# Patient Record
Sex: MALE | Race: WHITE | Employment: OTHER | URBAN - METROPOLITAN AREA
[De-identification: names, ages, dates, MRNs, and addresses within clinical notes are randomized per-mention and may not be internally consistent; named-entity substitution may affect disease eponyms.]

---

## 2017-03-01 ENCOUNTER — DOCUMENTATION ONLY (OUTPATIENT)
Dept: FAMILY MEDICINE CLINIC | Age: 77
End: 2017-03-01

## 2017-03-02 RX ORDER — FUROSEMIDE 40 MG/1
TABLET ORAL
Qty: 30 TAB | Refills: 0 | Status: SHIPPED | OUTPATIENT
Start: 2017-03-02 | End: 2017-04-15 | Stop reason: SDUPTHER

## 2017-03-07 ENCOUNTER — DOCUMENTATION ONLY (OUTPATIENT)
Dept: FAMILY MEDICINE CLINIC | Age: 77
End: 2017-03-07

## 2017-03-10 ENCOUNTER — OP HISTORICAL/CONVERTED ENCOUNTER (OUTPATIENT)
Dept: OTHER | Age: 77
End: 2017-03-10

## 2017-04-17 RX ORDER — FUROSEMIDE 40 MG/1
TABLET ORAL
Qty: 30 TAB | Refills: 0 | Status: SHIPPED | OUTPATIENT
Start: 2017-04-17

## 2017-06-09 ENCOUNTER — ED HISTORICAL/CONVERTED ENCOUNTER (OUTPATIENT)
Dept: OTHER | Age: 77
End: 2017-06-09

## 2017-06-21 ENCOUNTER — PATIENT OUTREACH (OUTPATIENT)
Dept: FAMILY MEDICINE CLINIC | Age: 77
End: 2017-06-21

## 2017-06-21 NOTE — PROGRESS NOTES
6/21/17, Patient listed on Daily Census/MSSP Report with alert of HCA/Hospitalization. This writer/NN able to confirm patient has required inpatient admission, 6/20/17 at [facility], related to Bilateral LE swelling and Shortness of Breath. Also, recent [facility] hospitalization, 5/25/17 -5/31/17 at [facility] related to COPD Exacerbation. Printed H&P, ED Provider Note and recent Discharge Summary for Dr Frank Shoemaker to review, however, patient last seen at [facility]/Dr Husain, 8/16/2016. Patient remains FULL CODE at this time per [facility] documentation. This writer/NN will continue to monitor for discharge and attempt to engage patient with NN/CCM. See Previous NN Documentation:  9/14/16, This writer/NN received message, PASSPORT REPORT, with alert of HCA/ED visit. This writer/NN and HCA access, able to confirm recent ED visit, patient seen 9/13/16 at [facility]/ED,[facility] related to shortness of breath/COPD with acute bronchitis. Printed ED Provider note for Dr Frank Shoemaker to review, as patient seen by Dr Frank Shoemaker, 8/16/16. Per ED provider note, patient seen at [facility], possible Bronchitis and suspicious mass on CXR. Patient offered admission, refused, agrees to F/U with PULM/Dr Reji Rai. Past Medical History Includes: COPD, CAD, Chronic Renal Failure, HTN, Hyperlipidemia, BPH and PVD. 9/14/16, This writer/NN attempted to contact patient at listed phone number, however, reached wife, [facility], listed on HIPAA verified with 2 identifiers, states patient is resting at this time. [facility] allowed this writer/NN to explain purpose of call, recent ED visit, importance of understanding discharge instructions, medications and F/U appts. Terrence Murray understanding and beginning expressing concerns of recent illness and healthcare.  This writer/NN allowed extra time for [facility] to voice frustrations, [facility] states patient stays in close contact with PULM/Dr Reji Rai, was given Z-pack about 2 weeks ago when all began, lots of weakness and weight loss, PULM recommended ED, however, tried [facility] on 9/12/16, not a positive experience, patient very weak, still not a lot of po intake, went home, finally went to sleep, then when awoke to go to bathroom, increased shortness of breath and at that point called 911/taken to [facility], and states still not a lot of care, still no one has addressed the weakness. Instruction provided on home health. Deyanira Quispe understanding, states patient ate a piece of toast, remains weak, has a call placed to [facility] and has scheduled appt with [facility], 9/23/16, agrees to continue to monitor through the day and return call if patient would like to schedule F/U appt or discuss HH options.  Sigifredo Davis agrees to allow this writer/NN to continue to follow, denies further questions at this time

## 2017-06-30 ENCOUNTER — PATIENT OUTREACH (OUTPATIENT)
Dept: FAMILY MEDICINE CLINIC | Age: 77
End: 2017-06-30

## 2017-06-30 NOTE — PROGRESS NOTES
6/30/17, Patient again listed on Daily Census/MSSP Report with alert of [facility] Hospitalization. This writer/NN and HCA access, able to confirm, patient admitted to [facility], 6/29/17, related to worsening shortness of breath. Printed H&P and Consult Notes for Dr Leah Cha to review, as this is 3rd recent hospitalization and patient last seen at [facility]/Dr Husain, 8/16/2016. Patient S/P  6/20/17 - 6/23/17 [facility], related to Bilateral LE swelling/Cellulitis and Shortness of Breath. Patient S/P  5/25/17 - 5/31/17 [facility] related to COPD Exacerbation. Patient remains FULL CODE at this time per [facility] documentation. 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 PLAN: This writer/NN will continue to monitor for discharge, assist with Transitions of Care, and attempt to engage patient with NN/CCM. See Previous NN/Patient Outreach Documentation:  9/14/16, This writer/NN received message, PASSPORT REPORT, with alert of HCA/ED visit. This writer/NN and HCA access, able to confirm recent ED visit, patient seen 9/13/16 at [facility]/ED,[facility] related to shortness of breath/COPD with acute bronchitis. Printed ED Provider note for Dr Leah Cha to review, as patient seen by Dr Leah Cha, 8/16/16. Per ED provider note, patient seen at [facility], possible Bronchitis and suspicious mass on CXR. Patient offered admission, refused, agrees to F/U with PULM/Dr Guerline Blum. Past Medical History Includes: COPD, CAD, Chronic Renal Failure, HTN, Hyperlipidemia, BPH and PVD. 9/14/16, This writer/NN attempted to contact patient at listed phone number, however, reached wife, Katalina Christianson, listed on HIPAA verified with 2 identifiers, states patient is resting at this time.  Katalina Christianson allowed this writer/NN to explain purpose of call, recent ED visit, importance of understanding discharge instructions, medications and F/U appts. Christian Smith understanding and beginning expressing concerns of recent illness and healthcare. This writer/NN allowed extra time for Damon Salguero to voice frustrations, Damon Salguero states patient stays in close contact with PULM/Dr Brannon Willingham, was given Z-pack about 2 weeks ago when all began, lots of weakness and weight loss, PULM recommended ED, however, tried [facility] on 9/12/16, not a positive experience, patient very weak, still not a lot of po intake, went home, finally went to sleep, then when awoke to go to bathroom, increased shortness of breath and at that point called 911/taken to [facility], and states still not a lot of care, still no one has addressed the weakness. Instruction provided on home health. Christian Smith understanding, states patient ate a piece of toast, remains weak, has a call placed to [facility] and has scheduled appt with [facility], 9/23/16, agrees to continue to monitor through the day and return call if patient would like to schedule F/U appt or discuss HH options.  Damon Salguero agrees to allow this writer/NN to continue to follow, denies further questions at this time

## 2017-07-03 ENCOUNTER — PATIENT OUTREACH (OUTPATIENT)
Dept: FAMILY MEDICINE CLINIC | Age: 77
End: 2017-07-03

## 2017-07-03 NOTE — PROGRESS NOTES
7/3/17, This writer/NN and HCA access, able to confirm, patient remains inpatient at this time, per documentation, may need further REHAB/[facility]. PLAN: This writer/NN will continue to monitor for discharge, assist with Transitions of Care, and attempt to engage patient with NN/CCM. See Previous NN/Patient Outreach Documentation:  6/30/17, Patient again listed on Daily Census/MSSP Report with alert of [facility] Hospitalization. This writer/NN and HCA access, able to confirm, patient admitted to [facility], 6/29/17, related to worsening shortness of breath. Printed H&P and Consult Notes for Dr Gregoria Cloud to review, as this is 3rd recent hospitalization and patient last seen at [facility]/Dr Husain, 8/16/2016. Patient S/P  6/20/17 - 6/23/17 [facility], related to Bilateral LE swelling/Cellulitis and Shortness of Breath. Patient S/P  5/25/17 - 5/31/17 [facility] related to COPD Exacerbation. Patient remains FULL CODE at this time per [facility] documentation. 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 See Previous NN/Patient Outreach Documentation:  9/14/16, This writer/NN received message, PASSPORT REPORT, with alert of H[facility]/ED visit. This writer/NN and HCA access, able to confirm recent ED visit, patient seen 9/13/16 at [facility]/ED,[facility] related to shortness of breath/COPD with acute bronchitis. Printed ED Provider note for Dr Gregoria Cloud to review, as patient seen by Dr Gregoria Cloud, 8/16/16. Per ED provider note, patient seen at [facility], possible Bronchitis and suspicious mass on CXR. Patient offered admission, refused, agrees to F/U with PULM/Dr Daksha Ghosh. Past Medical History Includes: COPD, CAD, Chronic Renal Failure, HTN, Hyperlipidemia, BPH and PVD.   9/14/16, This writer/NN attempted to contact patient at listed phone number, however, reached wife, Diane Scott, listed on HIPAA verified with 2 identifiers, states patient is resting at this time. Diane Scott allowed this writer/NN to explain purpose of call, recent ED visit, importance of understanding discharge instructions, medications and F/U appts. Tonya Gamboa understanding and beginning expressing concerns of recent illness and healthcare. This writer/NN allowed extra time for Diane Scott to voice frustrations, Diane Scott states patient stays in close contact with PULM/Dr Vivienne Slater, was given Z-pack about 2 weeks ago when all began, lots of weakness and weight loss, PULM recommended ED, however, tried [facility] on 9/12/16, not a positive experience, patient very weak, still not a lot of po intake, went home, finally went to sleep, then when awoke to go to bathroom, increased shortness of breath and at that point called 911/taken to [facility], and states still not a lot of care, still no one has addressed the weakness. Instruction provided on home health. Tonya Gamboa understanding, states patient ate a piece of toast, remains weak, has a call placed to [facility] and has scheduled appt with [facility], 9/23/16, agrees to continue to monitor through the day and return call if patient would like to schedule F/U appt or discuss HH options.  Diane Scott agrees to allow this writer/NN to continue to follow, denies further questions at this time

## 2017-07-05 ENCOUNTER — PATIENT OUTREACH (OUTPATIENT)
Dept: FAMILY MEDICINE CLINIC | Age: 77
End: 2017-07-05

## 2017-07-05 NOTE — PROGRESS NOTES
7/5/17, This writer/NN and HCA access, able to confirm, patient hospitalized at [facility], 6/29/17 - 7/3/17 related to COPD Exacerbation, discharged to [facility] for further [address] Per Discharge Summary:  Will need to monitor CBC/BMP(monitor Creatinine and Potassium, on lasix). Complete Prednisone Taper and then, back to 5mg daily. Close PULM and Cardiology F/U. Discuss Coreg with Cardiology/Dr Urbina, log BP and daily weights, call for 3-5 lb weight gain. Lasix to be given on Mondays and Thursdays, monitor lower extremity swelling. Per PULM-complete full course of Doxycycline, then start Azithromycin three times a week for COPD Prophylaxis against infections, PULM to determine duration needed. 7/5/17, This writer/NN contacted [facility], [phone], spoke to [place], [facility] patient remains at facility at this time, Salty Zepeda will be , left voicemail message with [facility] contact information, encourage PCP f/u, as patient last seen at [facility]/Dr Rhona Carbajal, 8/16/16,  and request for return call. PLAN: This writer/NN will continue to monitor for discharge, assist with Transitions of Care, and attempt to engage patient with NN/CCM. See Previous NN/Patient Outreach Documentation:  6/30/17, Patient again listed on Daily Census/MSSP Report with alert of [facility] Hospitalization. This writer/NN and HCA access, able to confirm, patient admitted to [facility], 6/29/17, related to worsening shortness of breath. Printed H&P and Consult Notes for Dr Rhona Carbajal to review, as this is 3rd recent hospitalization and patient last seen at [facility]/Dr Husain, 8/16/2016. Patient S/P  6/20/17 - 6/23/17 [facility], related to Bilateral LE swelling/Cellulitis and Shortness of Breath. Patient S/P  5/25/17 - 5/31/17 [facility] related to COPD Exacerbation. Patient remains FULL CODE at this time per [facility] documentation.   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 See Previous NN/Patient Outreach Documentation:  9/14/16, This writer/NN received message, PASSPORT REPORT, with alert of H[facility]/ED visit. This writer/NN and HCA access, able to confirm recent ED visit, patient seen 9/13/16 at [facility] related to shortness of breath/COPD with acute bronchitis. Printed ED Provider note for Dr Jacob Hinson to review, as patient seen by Dr Jacob Hinson, 8/16/16. Per ED provider note, patient seen at [facility], possible Bronchitis and suspicious mass on CXR. Patient offered admission, refused, agrees to F/U with PULM/Dr Jose Villarreal. Past Medical History Includes: COPD, CAD, Chronic Renal Failure, HTN, Hyperlipidemia, BPH and PVD. 9/14/16, This writer/NN attempted to contact patient at listed phone number, however, reached wife, Sammi Velasquez, listed on HIPAA verified with 2 identifiers, states patient is resting at this time. Sammi Velasquez allowed this writer/NN to explain purpose of call, recent ED visit, importance of understanding discharge instructions, medications and F/U appts. Pham Ritter understanding and beginning expressing concerns of recent illness and healthcare. This writer/NN allowed extra time for Sammi Velasquez to voice frustrations, Sammi Velasquez states patient stays in close contact with PULM/Dr Jose Villarreal, was given Z-pack about 2 weeks ago when all began, lots of weakness and weight loss, PULM recommended ED, however, tried [facility] on 9/12/16, not a positive experience, patient very weak, still not a lot of po intake, went home, finally went to sleep, then when awoke to go to bathroom, increased shortness of breath and at that point called 911/taken to [facility], and states still not a lot of care, still no one has addressed the weakness. Instruction provided on home health.  Pham Ritter understanding, states patient ate a piece of toast, remains weak, has a call placed to [facility] and has scheduled appt with [facility], 9/23/16, agrees to continue to monitor through the day and return call if patient would like to schedule F/U appt or discuss HH options.  Gamaliel Orantes agrees to allow this writer/NN to continue to follow, denies further questions at this time

## 2017-07-11 ENCOUNTER — PATIENT OUTREACH (OUTPATIENT)
Dept: FAMILY MEDICINE CLINIC | Age: 77
End: 2017-07-11

## 2017-07-11 NOTE — PROGRESS NOTES
[facility] - 7/3/17 related to COPD Exacerbation, discharged to [facility] for further [address] Per Discharge Summary:  Will need to monitor CBC/BMP(monitor Creatinine and Potassium, on lasix). Complete Prednisone Taper and then, back to 5mg daily. Close PULM and Cardiology F/U. Discuss Coreg with Cardiology/Dr Urbina, log BP and daily weights, call for 3-5 lb weight gain. Lasix to be given on Mondays and Thursdays, monitor lower extremity swelling. Per PULM-complete full course of Doxycycline, then start Azithromycin three times a week for COPD Prophylaxis against infections, PULM to determine duration needed. 7/11/17, This writer/NN contacted [facility], [phone], spoke to Domo Watkins, , states patient progressing towards discharge this week, will confirm PCP, currently lists [facility], agrees to discuss with patient and if [facility]/Dr Ashley Stringer remains PCP, agrees to fax discharge summary/medication reconciliation to [facility], [phone]. Patient will need continued HH/[facility] has been selected. PLAN: This writer/NN will continue to monitor for discharge, assist with Transitions of Care, and attempt to engage patient with NN/CCM. See Previous NN/Patient Outreach Documentation:  6/30/17, Patient again listed on Daily Census/MSSP Report with alert of [facility] Hospitalization. This writer/NN and HCA access, able to confirm, patient admitted to [facility], 6/29/17, related to worsening shortness of breath. Printed H&P and Consult Notes for Dr Ashley Stringer to review, as this is 3rd recent hospitalization and patient last seen at [facility]/Dr Husain, 8/16/2016. Patient S/P  6/20/17 - 6/23/17 [facility], related to Bilateral LE swelling/Cellulitis and Shortness of Breath. Patient S/P  5/25/17 - 5/31/17 [facility] related to COPD Exacerbation. Patient remains FULL CODE at this time per [facility] documentation.   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 See Previous NN/Patient Outreach Documentation:  9/14/16, This writer/NN received message, PASSPORT REPORT, with alert of H[facility]/ED visit. This writer/NN and H[facility] access, able to confirm recent ED visit, patient seen 9/13/16 at [facility]/ED,[facility] related to shortness of breath/COPD with acute bronchitis. Printed ED Provider note for Dr Priscilla Turner to review, as patient seen by Dr Priscilla Turner, 8/16/16. Per ED provider note, patient seen at [facility], possible Bronchitis and suspicious mass on CXR. Patient offered admission, refused, agrees to F/U with PULM/Dr Tariq Ramesh. Past Medical History Includes: COPD, CAD, Chronic Renal Failure, HTN, Hyperlipidemia, BPH and PVD. 9/14/16, This writer/NN attempted to contact patient at listed phone number, however, reached wife, Suly Vasquez, listed on HIPAA verified with 2 identifiers, states patient is resting at this time. Suly Vasquez allowed this writer/NN to explain purpose of call, recent ED visit, importance of understanding discharge instructions, medications and F/U appts. Ja Session understanding and beginning expressing concerns of recent illness and healthcare. This writer/NN allowed extra time for Suly Vasquez to voice frustrations, Suly Vasquez states patient stays in close contact with PULM/Dr Tariq Ramesh, was given Z-pack about 2 weeks ago when all began, lots of weakness and weight loss, PULM recommended ED, however, tried [facility] on 9/12/16, not a positive experience, patient very weak, still not a lot of po intake, went home, finally went to sleep, then when awoke to go to bathroom, increased shortness of breath and at that point called 911/taken to [facility], and states still not a lot of care, still no one has addressed the weakness.  Instruction provided on home health. Jonathan Corona understanding, states patient ate a piece of toast, remains weak, has a call placed to [facility] and has scheduled appt with [facility], 9/23/16, agrees to continue to monitor through the day and return call if patient would like to schedule F/U appt or discuss HH options.  Donnell Abebe agrees to allow this writer/NN to continue to follow, denies further questions at this time

## 2017-07-13 ENCOUNTER — PATIENT OUTREACH (OUTPATIENT)
Dept: FAMILY MEDICINE CLINIC | Age: 77
End: 2017-07-13

## 2017-07-13 NOTE — Clinical Note
Dr Tere Aponte, I changed PCP in [place] [id number] for further Inpatient PULMONARY REHAB, 7/3/17 - 7/12/17 [facility], S/P 6/29/17 - 7/3/17 related to COPD Exacerbation, Jose  to assist. 7/13/17, This writer/NN contacted [facility], [phone], spoke to Linda Sharma to discuss above hospitalization/REHAB and possible [facility], and need to confirm PCP, as patient last seen 8/2016. Josef verbalizes understanding, [facility] scheduled to start care today, agrees to send message to team to discuss PCP. Linda Sharma agrees to keep [facility] contact information, fax medication reconciliation and call with further updates, questions or concerns. 7/13/17, This writer/NN returned call to Jose, spoke to CINTHYA [facility] nurse has been to the home and confirmed, patient to return to [facility] for PCP needs.  Thanks, Chao Ruiz Initial /73 (BP Location: Left arm, Cuff Size: Adult Regular)  Pulse 76  SpO2 100% There is no height or weight on file to calculate BMI. .

## 2017-07-13 NOTE — PROGRESS NOTES
Patient recently at  [facility] for further Inpatient PULMONARY REHAB, 7/3/17 - 7/12/17 [facility], S/P 6/29/17 - 7/3/17 related to COPD Exacerbation[facility].  7/13/17, This writer/NN contacted [facility] [facility], [phone], spoke to Lai Epperson to discuss above hospitalization/REHAB and possible need for further [facility], and need to confirm PCP, as patient last seen 8/2016. Josef verbalizes understanding, [facility] scheduled to start care today, agrees to send message to team to discuss PCP. Lai Epperson agrees to keep IFP/NN contact information, fax medication reconciliation and call with further updates, questions or concerns. 7/13/17, This writer/NN returned call to [facility], spoke to CINTHYA, [facility] nurse has been to the home and confirmed, patient to return to [facility] for PCP needs. CINTHYA agrees to keep IFP/NN contact information to call with further questions or concerns. This writer/NN agrees to notify Dr Yessi Beck. PLAN: Continue to assist with Transitions of Care, verify PCP and attempt to engage patient with NN/CCM. Discuss discharge instructions, medications(compliance and reconciliation), further symptoms or concerns, daily weights, HH progress and F/U appts. Provide instruction, goal work and resource connection as indicated. See Previous NN/Patient Outreach Documentation:  Patient S/P [facility], 6/29/17 - 7/3/17 related to COPD Exacerbation, discharged to [facility] for further Inpatient PULMONARY REHAB.   Per Discharge Summary:  Will need to monitor CBC/BMP(monitor Creatinine and Potassium, on lasix). Complete Prednisone Taper and then, back to 5mg daily. Close PULM and Cardiology F/U. Discuss Coreg with Cardiology/Dr Urbina, log BP and daily weights, call for 3-5 lb weight gain. Lasix to be given on Mondays and Thursdays, monitor lower extremity swelling.   Per PULM-complete full course of Doxycycline, then start Azithromycin three times a week for COPD Prophylaxis against infections, PULM to determine duration needed. 7/11/17, This writer/NN contacted [facility], [phone], spoke to Tomy Schmidt, , states patient progressing towards discharge this week, will confirm PCP, currently lists [facility], agrees to discuss with patient and if [facility]/Dr Mindy Silverio remains PCP, agrees to fax discharge summary/medication reconciliation to IFP/NN, [phone]. Patient will need continued HH/[facility] has been selected. See Previous NN/Patient Outreach Documentation:  6/30/17, Patient again listed on Daily Census/MSSP Report with alert of [facility] Hospitalization. This writer/NN and [facility] access, able to confirm, patient admitted to [facility], 6/29/17, related to worsening shortness of breath. Printed H&P and Consult Notes for Dr Mindy Silverio to review, as this is 3rd recent hospitalization and patient last seen at [facility]/Dr Husain, 8/16/2016. Patient S/P  6/20/17 - 6/23/17 [facility], related to Bilateral LE swelling/Cellulitis and Shortness of Breath. Patient S/P  5/25/17 - 5/31/17 [facility] related to COPD Exacerbation. Patient remains FULL CODE at this time per [facility] documentation. 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 See Previous NN/Patient Outreach Documentation:  9/14/16, This writer/NN received message, PASSPORT REPORT, with alert of [facility]/ED visit. This writer/NN and [facility] access, able to confirm recent ED visit, patient seen 9/13/16 at [facility]/ED,[facility] related to shortness of breath/COPD with acute bronchitis. Printed ED Provider note for Dr Mindy Silverio to review, as patient seen by Dr Mindy Silverio, 8/16/16. Per ED provider note, patient seen at [facility], possible Bronchitis and suspicious mass on CXR.  Patient offered admission, refused, agrees to F/U with PULM/Dr [place]. Past Medical History Includes: COPD, CAD, Chronic Renal Failure, HTN, Hyperlipidemia, BPH and PVD. 9/14/16, This writer/NN attempted to contact patient at listed phone number, however, reached wife, Consuelo Badillo, listed on HIPAA verified with 2 identifiers, states patient is resting at this time. Consuelo Ridiam allowed this writer/NN to explain purpose of call, recent ED visit, importance of understanding discharge instructions, medications and F/U appts. Farzana Rincon understanding and beginning expressing concerns of recent illness and healthcare. This writer/NN allowed extra time for Consuelo Ridiam to voice frustrations, Consuelo Badillo states patient stays in close contact with PULM/Dr aFraz Araujo, was given Z-pack about 2 weeks ago when all began, lots of weakness and weight loss, PULM recommended ED, however, tried [facility] on 9/12/16, not a positive experience, patient very weak, still not a lot of po intake, went home, finally went to sleep, then when awoke to go to bathroom, increased shortness of breath and at that point called 911/taken to [facility], and states still not a lot of care, still no one has addressed the weakness. Instruction provided on home health. Farzana Rincon understanding, states patient ate a piece of toast, remains weak, has a call placed to PULM and has scheduled appt with PULM, 9/23/16, agrees to continue to monitor through the day and return call if patient would like to schedule F/U appt or discuss HH options.  Consuelo Badillo agrees to allow this writer/NN to continue to follow, denies further questions at this time

## 2017-07-18 ENCOUNTER — DOCUMENTATION ONLY (OUTPATIENT)
Dept: FAMILY MEDICINE CLINIC | Age: 77
End: 2017-07-18

## 2017-08-01 ENCOUNTER — DOCUMENTATION ONLY (OUTPATIENT)
Dept: FAMILY MEDICINE CLINIC | Age: 77
End: 2017-08-01

## 2018-04-03 ENCOUNTER — OP HISTORICAL/CONVERTED ENCOUNTER (OUTPATIENT)
Dept: OTHER | Age: 78
End: 2018-04-03

## 2018-04-10 ENCOUNTER — OP HISTORICAL/CONVERTED ENCOUNTER (OUTPATIENT)
Dept: OTHER | Age: 78
End: 2018-04-10

## 2018-04-17 ENCOUNTER — OP HISTORICAL/CONVERTED ENCOUNTER (OUTPATIENT)
Dept: OTHER | Age: 78
End: 2018-04-17

## 2018-04-24 ENCOUNTER — OP HISTORICAL/CONVERTED ENCOUNTER (OUTPATIENT)
Dept: OTHER | Age: 78
End: 2018-04-24

## 2018-05-01 ENCOUNTER — OP HISTORICAL/CONVERTED ENCOUNTER (OUTPATIENT)
Dept: OTHER | Age: 78
End: 2018-05-01

## 2018-05-14 ENCOUNTER — OP HISTORICAL/CONVERTED ENCOUNTER (OUTPATIENT)
Dept: OTHER | Age: 78
End: 2018-05-14

## 2018-05-22 ENCOUNTER — OP HISTORICAL/CONVERTED ENCOUNTER (OUTPATIENT)
Dept: OTHER | Age: 78
End: 2018-05-22

## 2018-05-29 ENCOUNTER — OP HISTORICAL/CONVERTED ENCOUNTER (OUTPATIENT)
Dept: OTHER | Age: 78
End: 2018-05-29

## 2018-06-05 ENCOUNTER — OP HISTORICAL/CONVERTED ENCOUNTER (OUTPATIENT)
Dept: OTHER | Age: 78
End: 2018-06-05

## 2018-06-12 ENCOUNTER — OP HISTORICAL/CONVERTED ENCOUNTER (OUTPATIENT)
Dept: OTHER | Age: 78
End: 2018-06-12

## 2018-06-13 ENCOUNTER — OP HISTORICAL/CONVERTED ENCOUNTER (OUTPATIENT)
Dept: OTHER | Age: 78
End: 2018-06-13

## 2018-06-20 ENCOUNTER — OP HISTORICAL/CONVERTED ENCOUNTER (OUTPATIENT)
Dept: OTHER | Age: 78
End: 2018-06-20

## 2018-06-26 ENCOUNTER — OP HISTORICAL/CONVERTED ENCOUNTER (OUTPATIENT)
Dept: OTHER | Age: 78
End: 2018-06-26

## 2018-07-17 ENCOUNTER — OP HISTORICAL/CONVERTED ENCOUNTER (OUTPATIENT)
Dept: OTHER | Age: 78
End: 2018-07-17

## 2018-07-24 ENCOUNTER — OP HISTORICAL/CONVERTED ENCOUNTER (OUTPATIENT)
Dept: OTHER | Age: 78
End: 2018-07-24

## 2018-07-31 ENCOUNTER — OP HISTORICAL/CONVERTED ENCOUNTER (OUTPATIENT)
Dept: OTHER | Age: 78
End: 2018-07-31

## 2019-02-21 ENCOUNTER — OP HISTORICAL/CONVERTED ENCOUNTER (OUTPATIENT)
Dept: OTHER | Age: 79
End: 2019-02-21